# Patient Record
Sex: MALE | Race: WHITE
[De-identification: names, ages, dates, MRNs, and addresses within clinical notes are randomized per-mention and may not be internally consistent; named-entity substitution may affect disease eponyms.]

---

## 2017-05-20 ENCOUNTER — HOSPITAL ENCOUNTER (OUTPATIENT)
Dept: HOSPITAL 79 - LAB | Age: 47
End: 2017-05-20
Payer: COMMERCIAL

## 2017-05-20 DIAGNOSIS — Z02.83: Primary | ICD-10-CM

## 2021-04-16 ENCOUNTER — HOSPITAL ENCOUNTER (INPATIENT)
Dept: HOSPITAL 79 - ER1 | Age: 51
LOS: 6 days | Discharge: HOME | DRG: 897 | End: 2021-04-22
Attending: HOSPITALIST | Admitting: STUDENT IN AN ORGANIZED HEALTH CARE EDUCATION/TRAINING PROGRAM
Payer: MEDICARE

## 2021-04-16 VITALS — HEIGHT: 70 IN | WEIGHT: 159.56 LBS | BODY MASS INDEX: 22.84 KG/M2

## 2021-04-16 DIAGNOSIS — F17.210: ICD-10-CM

## 2021-04-16 DIAGNOSIS — E86.1: ICD-10-CM

## 2021-04-16 DIAGNOSIS — Z20.822: ICD-10-CM

## 2021-04-16 DIAGNOSIS — D64.9: ICD-10-CM

## 2021-04-16 DIAGNOSIS — F10.139: Primary | ICD-10-CM

## 2021-04-16 DIAGNOSIS — J44.9: ICD-10-CM

## 2021-04-16 DIAGNOSIS — Z82.0: ICD-10-CM

## 2021-04-16 DIAGNOSIS — E87.6: ICD-10-CM

## 2021-04-16 DIAGNOSIS — I10: ICD-10-CM

## 2021-04-16 DIAGNOSIS — E87.1: ICD-10-CM

## 2021-04-16 DIAGNOSIS — E83.42: ICD-10-CM

## 2021-04-16 DIAGNOSIS — B96.20: ICD-10-CM

## 2021-04-16 DIAGNOSIS — N30.00: ICD-10-CM

## 2021-04-16 DIAGNOSIS — Z83.3: ICD-10-CM

## 2021-04-16 DIAGNOSIS — R07.81: ICD-10-CM

## 2021-04-16 DIAGNOSIS — Z82.49: ICD-10-CM

## 2021-04-16 LAB
BUN/CREATININE RATIO: 13 (ref 0–10)
BUN/CREATININE RATIO: 16 (ref 0–10)
HGB BLD-MCNC: 12.9 GM/DL (ref 14–17.5)
RED BLOOD COUNT: 4.01 M/UL (ref 4.2–5.5)
WHITE BLOOD COUNT: 12.1 K/UL (ref 4.5–11)

## 2021-04-16 PROCEDURE — G0480 DRUG TEST DEF 1-7 CLASSES: HCPCS

## 2021-04-16 PROCEDURE — U0002 COVID-19 LAB TEST NON-CDC: HCPCS

## 2021-04-17 LAB
BUN/CREATININE RATIO: 13 (ref 0–10)
BUN/CREATININE RATIO: 16 (ref 0–10)
BUN/CREATININE RATIO: 18 (ref 0–10)
BUN/CREATININE RATIO: 19 (ref 0–10)
HGB BLD-MCNC: 10.7 GM/DL (ref 14–17.5)
RED BLOOD COUNT: 3.37 M/UL (ref 4.2–5.5)
WHITE BLOOD COUNT: 8.3 K/UL (ref 4.5–11)

## 2021-04-18 LAB
BUN/CREATININE RATIO: 15 (ref 0–10)
HGB BLD-MCNC: 9.7 GM/DL (ref 14–17.5)
RED BLOOD COUNT: 3.01 M/UL (ref 4.2–5.5)
WHITE BLOOD COUNT: 8.5 K/UL (ref 4.5–11)

## 2021-04-19 LAB
BUN/CREATININE RATIO: 16 (ref 0–10)
HGB BLD-MCNC: 9.9 GM/DL (ref 14–17.5)
RED BLOOD COUNT: 3.08 M/UL (ref 4.2–5.5)
WHITE BLOOD COUNT: 7.4 K/UL (ref 4.5–11)

## 2021-04-20 LAB
BUN/CREATININE RATIO: 13 (ref 0–10)
HGB BLD-MCNC: 9.5 GM/DL (ref 14–17.5)
RED BLOOD COUNT: 2.98 M/UL (ref 4.2–5.5)
WHITE BLOOD COUNT: 7 K/UL (ref 4.5–11)

## 2021-04-20 NOTE — NUR
WHILE PASSING MEDICATION PATIENT NOTED TO BEGIN HAVING A SEIZURE. PROVIDER AT
BEDSIDE. PATIENT NOTED TO BE HAVING JERKING MOVEMENTS. PATIEN GIVEN ATIVAN 2MG
IVP PER PROVIDER VERBAL ORDER. PATIENT ALSO PLACED ON 4LNC PER PROVIDER VERBAL
ORDER.

## 2021-04-21 LAB
BUN/CREATININE RATIO: 12 (ref 0–10)
HGB BLD-MCNC: 10 GM/DL (ref 14–17.5)
RED BLOOD COUNT: 3.08 M/UL (ref 4.2–5.5)
WHITE BLOOD COUNT: 5.8 K/UL (ref 4.5–11)

## 2021-04-22 LAB
BUN/CREATININE RATIO: 8 (ref 0–10)
HGB BLD-MCNC: 9.7 GM/DL (ref 14–17.5)
RED BLOOD COUNT: 3.02 M/UL (ref 4.2–5.5)
WHITE BLOOD COUNT: 6.1 K/UL (ref 4.5–11)

## 2021-10-07 ENCOUNTER — HOSPITAL ENCOUNTER (INPATIENT)
Dept: HOSPITAL 79 - ER1 | Age: 51
LOS: 13 days | Discharge: HOME | DRG: 463 | End: 2021-10-20
Attending: INTERNAL MEDICINE | Admitting: INTERNAL MEDICINE
Payer: MEDICARE

## 2021-10-07 VITALS — HEIGHT: 70 IN | BODY MASS INDEX: 24.34 KG/M2 | WEIGHT: 170 LBS

## 2021-10-07 DIAGNOSIS — E43: ICD-10-CM

## 2021-10-07 DIAGNOSIS — K74.60: ICD-10-CM

## 2021-10-07 DIAGNOSIS — Z83.3: ICD-10-CM

## 2021-10-07 DIAGNOSIS — Z91.14: ICD-10-CM

## 2021-10-07 DIAGNOSIS — L02.31: ICD-10-CM

## 2021-10-07 DIAGNOSIS — M54.16: ICD-10-CM

## 2021-10-07 DIAGNOSIS — Z82.49: ICD-10-CM

## 2021-10-07 DIAGNOSIS — Z96.611: ICD-10-CM

## 2021-10-07 DIAGNOSIS — M48.56XA: Primary | ICD-10-CM

## 2021-10-07 DIAGNOSIS — I10: ICD-10-CM

## 2021-10-07 DIAGNOSIS — R82.71: ICD-10-CM

## 2021-10-07 DIAGNOSIS — G40.909: ICD-10-CM

## 2021-10-07 DIAGNOSIS — E87.1: ICD-10-CM

## 2021-10-07 DIAGNOSIS — F17.210: ICD-10-CM

## 2021-10-07 DIAGNOSIS — E55.9: ICD-10-CM

## 2021-10-07 DIAGNOSIS — K76.0: ICD-10-CM

## 2021-10-07 DIAGNOSIS — M48.54XA: ICD-10-CM

## 2021-10-07 DIAGNOSIS — Z20.822: ICD-10-CM

## 2021-10-07 DIAGNOSIS — G62.1: ICD-10-CM

## 2021-10-07 DIAGNOSIS — K80.80: ICD-10-CM

## 2021-10-07 DIAGNOSIS — I82.401: ICD-10-CM

## 2021-10-07 DIAGNOSIS — S93.401A: ICD-10-CM

## 2021-10-07 DIAGNOSIS — J44.9: ICD-10-CM

## 2021-10-07 LAB
BUN/CREATININE RATIO: 7 (ref 0–10)
HGB BLD-MCNC: 13.5 GM/DL (ref 14–17.5)
RED BLOOD COUNT: 4.2 M/UL (ref 4.2–5.5)
WHITE BLOOD COUNT: 7.8 K/UL (ref 4.5–11)

## 2021-10-07 PROCEDURE — U0002 COVID-19 LAB TEST NON-CDC: HCPCS

## 2021-10-07 PROCEDURE — G0480 DRUG TEST DEF 1-7 CLASSES: HCPCS

## 2021-10-07 PROCEDURE — G0378 HOSPITAL OBSERVATION PER HR: HCPCS

## 2021-10-08 LAB
BUN/CREATININE RATIO: 8 (ref 0–10)
HGB BLD-MCNC: 12.7 GM/DL (ref 14–17.5)
RED BLOOD COUNT: 4.03 M/UL (ref 4.2–5.5)
WHITE BLOOD COUNT: 7 K/UL (ref 4.5–11)

## 2021-10-09 LAB
BUN/CREATININE RATIO: 9 (ref 0–10)
HGB BLD-MCNC: 12.6 GM/DL (ref 14–17.5)
RED BLOOD COUNT: 3.98 M/UL (ref 4.2–5.5)
WHITE BLOOD COUNT: 6.5 K/UL (ref 4.5–11)

## 2021-10-10 LAB
BUN/CREATININE RATIO: 9 (ref 0–10)
HEP C VIRUS AB: 0.2 (ref 0–0.9)

## 2021-10-10 NOTE — NUR
pt is on a CIWA protocol and has scored 4 for 2 assessments. at 0100 pt called
out with complaint of increased anxiety with nausea, and tremors. CIWA score
was 8. Dr. Funes notified of score and 1mg ativan was given at 0121.
Recheck was done 30 after prn medication with a score of 2.

## 2021-10-11 LAB
BUN/CREATININE RATIO: 8 (ref 0–10)
HGB BLD-MCNC: 11.5 GM/DL (ref 14–17.5)
RED BLOOD COUNT: 3.68 M/UL (ref 4.2–5.5)
T-TRANSGLUTAMINASE (TTG) IGG: (no result)
VITAMIN D, 25-HYDROXY: 9 NG/ML (ref 30–100)
WHITE BLOOD COUNT: 6.6 K/UL (ref 4.5–11)

## 2021-10-12 LAB
BUN/CREATININE RATIO: 10 (ref 0–10)
HGB BLD-MCNC: 11.8 GM/DL (ref 14–17.5)
RED BLOOD COUNT: 3.78 M/UL (ref 4.2–5.5)
T-TRANSGLUTAMINASE (TTG) IGA: <2 U/ML (ref 0–3)
WHITE BLOOD COUNT: 5.2 K/UL (ref 4.5–11)

## 2021-10-12 PROCEDURE — 0J990ZZ DRAINAGE OF BUTTOCK SUBCUTANEOUS TISSUE AND FASCIA, OPEN APPROACH: ICD-10-PCS | Performed by: SURGERY

## 2021-10-12 PROCEDURE — 0JB90ZZ EXCISION OF BUTTOCK SUBCUTANEOUS TISSUE AND FASCIA, OPEN APPROACH: ICD-10-PCS | Performed by: SURGERY

## 2021-10-13 LAB — BUN/CREATININE RATIO: 23 (ref 0–10)

## 2021-10-14 LAB — BUN/CREATININE RATIO: 18 (ref 0–10)

## 2021-10-16 LAB
BUN/CREATININE RATIO: 10 (ref 0–10)
HGB BLD-MCNC: 12.6 GM/DL (ref 14–17.5)
RED BLOOD COUNT: 3.92 M/UL (ref 4.2–5.5)
WHITE BLOOD COUNT: 7 K/UL (ref 4.5–11)

## 2021-10-20 LAB
BUN/CREATININE RATIO: 20 (ref 0–10)
HGB BLD-MCNC: 12.1 GM/DL (ref 14–17.5)
RED BLOOD COUNT: 3.93 M/UL (ref 4.2–5.5)
WHITE BLOOD COUNT: 7.7 K/UL (ref 4.5–11)

## 2021-12-17 ENCOUNTER — HOSPITAL ENCOUNTER (INPATIENT)
Dept: HOSPITAL 79 - ER1 | Age: 51
LOS: 20 days | Discharge: ANOTHER HOSPITAL | DRG: 871 | End: 2022-01-06
Attending: INTERNAL MEDICINE | Admitting: INTERNAL MEDICINE
Payer: MEDICARE

## 2021-12-17 VITALS — HEIGHT: 70.98 IN | WEIGHT: 125 LBS | BODY MASS INDEX: 17.5 KG/M2

## 2021-12-17 DIAGNOSIS — A41.89: Primary | ICD-10-CM

## 2021-12-17 DIAGNOSIS — J44.1: ICD-10-CM

## 2021-12-17 DIAGNOSIS — R73.9: ICD-10-CM

## 2021-12-17 DIAGNOSIS — I10: ICD-10-CM

## 2021-12-17 DIAGNOSIS — Z88.8: ICD-10-CM

## 2021-12-17 DIAGNOSIS — L89.322: ICD-10-CM

## 2021-12-17 DIAGNOSIS — B96.20: ICD-10-CM

## 2021-12-17 DIAGNOSIS — J96.21: ICD-10-CM

## 2021-12-17 DIAGNOSIS — L89.312: ICD-10-CM

## 2021-12-17 DIAGNOSIS — Z82.49: ICD-10-CM

## 2021-12-17 DIAGNOSIS — E87.6: ICD-10-CM

## 2021-12-17 DIAGNOSIS — E78.5: ICD-10-CM

## 2021-12-17 DIAGNOSIS — J45.909: ICD-10-CM

## 2021-12-17 DIAGNOSIS — F17.210: ICD-10-CM

## 2021-12-17 DIAGNOSIS — G40.909: ICD-10-CM

## 2021-12-17 DIAGNOSIS — Z79.01: ICD-10-CM

## 2021-12-17 DIAGNOSIS — Z91.14: ICD-10-CM

## 2021-12-17 DIAGNOSIS — Z83.3: ICD-10-CM

## 2021-12-17 DIAGNOSIS — Z20.822: ICD-10-CM

## 2021-12-17 DIAGNOSIS — I48.91: ICD-10-CM

## 2021-12-17 DIAGNOSIS — J18.9: ICD-10-CM

## 2021-12-17 DIAGNOSIS — G93.41: ICD-10-CM

## 2021-12-17 DIAGNOSIS — R65.21: ICD-10-CM

## 2021-12-17 DIAGNOSIS — N30.00: ICD-10-CM

## 2021-12-17 DIAGNOSIS — F10.139: ICD-10-CM

## 2021-12-17 DIAGNOSIS — J69.0: ICD-10-CM

## 2021-12-17 DIAGNOSIS — Z98.890: ICD-10-CM

## 2021-12-17 LAB
BUN/CREATININE RATIO: 16 (ref 0–10)
HGB BLD-MCNC: 16.6 GM/DL (ref 14–17.5)
RED BLOOD COUNT: 5.64 M/UL (ref 4.2–5.5)
WHITE BLOOD COUNT: 22.4 K/UL (ref 4.5–11)

## 2021-12-17 PROCEDURE — A6212 FOAM DRG <=16 SQ IN W/BORDER: HCPCS

## 2021-12-17 PROCEDURE — U0002 COVID-19 LAB TEST NON-CDC: HCPCS

## 2021-12-17 PROCEDURE — C9113 INJ PANTOPRAZOLE SODIUM, VIA: HCPCS

## 2021-12-17 PROCEDURE — C1751 CATH, INF, PER/CENT/MIDLINE: HCPCS

## 2021-12-17 PROCEDURE — G0480 DRUG TEST DEF 1-7 CLASSES: HCPCS

## 2021-12-17 PROCEDURE — 5A0945A ASSISTANCE WITH RESPIRATORY VENTILATION, 24-96 CONSECUTIVE HOURS, HIGH NASAL FLOW/VELOCITY: ICD-10-PCS | Performed by: INTERNAL MEDICINE

## 2021-12-18 LAB
BUN/CREATININE RATIO: 13 (ref 0–10)
BUN/CREATININE RATIO: 9 (ref 0–10)
HGB BLD-MCNC: 12.5 GM/DL (ref 14–17.5)
RED BLOOD COUNT: 4.34 M/UL (ref 4.2–5.5)
WHITE BLOOD COUNT: 15.9 K/UL (ref 4.5–11)

## 2021-12-19 LAB
BUN/CREATININE RATIO: 7 (ref 0–10)
BUN/CREATININE RATIO: 8 (ref 0–10)

## 2021-12-20 LAB
BUN/CREATININE RATIO: 6 (ref 0–10)
HGB BLD-MCNC: 13.7 GM/DL (ref 14–17.5)
RED BLOOD COUNT: 4.85 M/UL (ref 4.2–5.5)
WHITE BLOOD COUNT: 10.6 K/UL (ref 4.5–11)

## 2021-12-20 NOTE — NUR
DR KANG MADE AWARE PT HAS NO IV ACCESS AND HAS MISSED MULTIPLE ANTIBIOTICS
AS WELL AS HIS CT SCANS WERE NOT ABLE TO BE COMPLETED DUETO NO IV ACCESS.
AWAITING FURTHER ORDERS.

## 2021-12-21 LAB — BUN/CREATININE RATIO: 4 (ref 0–10)

## 2021-12-21 NOTE — NUR
dr starr called and aware of potassium being 2.6, new orders for protocol
intiaited. she has also been reminded of no iv access for the pt

## 2021-12-21 NOTE — NUR
PT HAS NO IV ACCESS AT THIS TIME, WAS TOLD IN REPORT MD IS AWARE AND AWAITING
ORDERS. PT STATES THAT HE MAY BE POSSIBLY GETTING A PICC LINE PLACED.

## 2021-12-23 LAB — BUN/CREATININE RATIO: 11 (ref 0–10)

## 2021-12-24 LAB
BUN/CREATININE RATIO: 15 (ref 0–10)
HGB BLD-MCNC: 13.7 GM/DL (ref 14–17.5)
RED BLOOD COUNT: 4.74 M/UL (ref 4.2–5.5)
WHITE BLOOD COUNT: 15.6 K/UL (ref 4.5–11)

## 2021-12-24 PROCEDURE — 5A09357 ASSISTANCE WITH RESPIRATORY VENTILATION, LESS THAN 24 CONSECUTIVE HOURS, CONTINUOUS POSITIVE AIRWAY PRESSURE: ICD-10-PCS | Performed by: INTERNAL MEDICINE

## 2021-12-24 NOTE — NUR
RN NOTED PATIENT TO BE UTILIZING ABDOMINAL MUSCLES TO BREATHE. PATIENT HEART
RATE 128 AND BLOOD PRESSURE 140/96. RN LISTENED TO PATIENT'S LUNGS UTILIZING
STETHOSCOPE DUE TO AUDIBLE WHEEZING. PATIENT BED LINEN CHANGED AND PATIENT SAT
UPRIGHT IN HIGH MAJANO'S POSITION. RN ENCOURAGED PATIENT TO COUGH AND DEEP
BREATHE. RN INCREASED OXYGEN TO 7 LITERS NASAL CANNULA. PATIENT OXYGEN NOTED
TO BE 90% UP FROM 88% ON 5 LITERS NASAL CANNULA. RN ATTEMPTED TO NOTIFY DR. HALL ABOUT PATIENT'S CHANGE IN CONDITION WITH NO ANSWER. RN LEFT VOICEMAIL TO
CALL HER BACK AS SOON AS POSSIBLE. RN CALLED DR. WILSON AND NOTIFIED HIM OF
PATIENT'S CHANGE IN CONDITION. NO NEW ORDERS NOTED. RN DID NOT ADMINISTER
MORNING MEDICATIONS DUE TO FEAR OF ASPIRATION. CONTINUOUS TELEMETRY AND PUSE
OXIMETRY IN PLACE. BED LOCKED AND LOW. CALL LIGHT WITHIN REACH.

## 2021-12-24 NOTE — NUR
RN ADMINISTERED PATIENT'S IV LASIX AND SAT PATIENT UP IN A HIGH MAJANO'S
POSITION TO ADMINISTER PO METOPROLOL. PATIENT SWALLOWED PILL BUT ONCE HE DRANK
THE WATER RN OFFERED HIM, HE BECAME SILENT AND COULD NOT COUGH. RN HELD ONTO
HIS LOWER BACK AND BENT HIM FORWARD AND URGED HIM TO COUGH. PATIENT FINALLY
BEGAN TO COUGH AND SPEAK AGAIN. RN CALLED DR. HALL AND NOTIFIED HER OF
PATIENT'S EPISODE. RN RECEIVED A CALL FROM TELEMETRY STATING THE PATIENT'S
OXYGEN WAS IN THE 70'S AND FALLING INTO THE 60'S. RN RANG THE STAFF ASSIST
BUTTON AND INCREASED PATIENT'S OXYGEN TO 7 LITERS. STAFF CALLED AN RRT AND DR. HALL ENTERED AND ORDERED PATIENT TO BE BAG MASKED. PATIENT OXYGEN INCREASED
TO 80'S. MD ORDERED RN TO HAVE PULMONOLOGY ASSESS PATIENT TO DETERMINE IF
PATIENT NEEDED TO BE ON PCU OR MED SURG. RN ATTEMPTED TO CALL DR. WILSON BUT
HE WAS IN THE ICU WITH ANOTHER PATIENT. RN ASKED ICU NURSE TO HAVE HIM COME TO
ROOM 4129 AS SOON AS POSSIBLE. MD CALLED RN AND ASKED WHAT WAS GOING ON WITH
THE PATIENT, RN UPDATED HIM, MD CAME TO ASSESS PATIENT. MD ORDERED RESPIRATORY
THERAPIST TO PLACE PATIENT ON A BIPAP AND EVENTUALLY PATIENT OXYGEN INCREASED
TO 94%. MD AND STAFF DECIDED PATIENT WOULD BE BETTER SUITED FOR THE ICU AT
THIS TIME. MD ORDERED RN TO PLACE A FIORE CATHETER FOR SKIN INTEGRITY
PROMOTION, CATHETER PLACED. VERBAL REPORT GIVEN TO ICU NURSE THAT CAME TO RRT,
PATIENT TAKEN TO ICU BY RN AND RESOURCE NURSE.

## 2021-12-25 LAB
BUN/CREATININE RATIO: 26 (ref 0–10)
HGB BLD-MCNC: 13.7 GM/DL (ref 14–17.5)
RED BLOOD COUNT: 4.76 M/UL (ref 4.2–5.5)
WHITE BLOOD COUNT: 15.5 K/UL (ref 4.5–11)

## 2021-12-25 PROCEDURE — 02HV33Z INSERTION OF INFUSION DEVICE INTO SUPERIOR VENA CAVA, PERCUTANEOUS APPROACH: ICD-10-PCS | Performed by: INTERNAL MEDICINE

## 2021-12-25 PROCEDURE — B548ZZA ULTRASONOGRAPHY OF SUPERIOR VENA CAVA, GUIDANCE: ICD-10-PCS | Performed by: INTERNAL MEDICINE

## 2021-12-25 PROCEDURE — 0BH17EZ INSERTION OF ENDOTRACHEAL AIRWAY INTO TRACHEA, VIA NATURAL OR ARTIFICIAL OPENING: ICD-10-PCS | Performed by: INTERNAL MEDICINE

## 2021-12-25 NOTE — NUR
DR. BAKER DLIW AT BEDSIDE. DECISION WAS MADE BY MD TO INTUBATE PATIENT. ATTEMPTED
TO NOTIFY  LISTED WITH NO SUCCESS. INTUBATION WAS SUCCESSFUL
WITHOUT COMPLICATION.

## 2021-12-26 LAB
BUN/CREATININE RATIO: 22 (ref 0–10)
HGB BLD-MCNC: 13.2 GM/DL (ref 14–17.5)
RED BLOOD COUNT: 4.59 M/UL (ref 4.2–5.5)
WHITE BLOOD COUNT: 28.8 K/UL (ref 4.5–11)

## 2021-12-26 PROCEDURE — 3E043XZ INTRODUCTION OF VASOPRESSOR INTO CENTRAL VEIN, PERCUTANEOUS APPROACH: ICD-10-PCS | Performed by: INTERNAL MEDICINE

## 2021-12-26 PROCEDURE — B24BZZZ ULTRASONOGRAPHY OF HEART WITH AORTA: ICD-10-PCS | Performed by: INTERNAL MEDICINE

## 2021-12-27 LAB
BUN/CREATININE RATIO: 22 (ref 0–10)
HGB BLD-MCNC: 13.2 GM/DL (ref 14–17.5)
RED BLOOD COUNT: 4.64 M/UL (ref 4.2–5.5)
WHITE BLOOD COUNT: 19 K/UL (ref 4.5–11)

## 2021-12-28 LAB
BUN/CREATININE RATIO: 22 (ref 0–10)
HGB BLD-MCNC: 13.4 GM/DL (ref 14–17.5)
RED BLOOD COUNT: 4.71 M/UL (ref 4.2–5.5)
WHITE BLOOD COUNT: 18.7 K/UL (ref 4.5–11)

## 2021-12-28 PROCEDURE — 5A1945Z RESPIRATORY VENTILATION, 24-96 CONSECUTIVE HOURS: ICD-10-PCS | Performed by: INTERNAL MEDICINE

## 2021-12-28 PROCEDURE — 0B968ZZ DRAINAGE OF RIGHT LOWER LOBE BRONCHUS, VIA NATURAL OR ARTIFICIAL OPENING ENDOSCOPIC: ICD-10-PCS | Performed by: INTERNAL MEDICINE

## 2021-12-28 PROCEDURE — 0B938ZZ DRAINAGE OF RIGHT MAIN BRONCHUS, VIA NATURAL OR ARTIFICIAL OPENING ENDOSCOPIC: ICD-10-PCS | Performed by: INTERNAL MEDICINE

## 2021-12-28 PROCEDURE — 0B9J8ZZ DRAINAGE OF LEFT LOWER LUNG LOBE, VIA NATURAL OR ARTIFICIAL OPENING ENDOSCOPIC: ICD-10-PCS | Performed by: INTERNAL MEDICINE

## 2021-12-28 PROCEDURE — 0B9G8ZZ DRAINAGE OF LEFT UPPER LUNG LOBE, VIA NATURAL OR ARTIFICIAL OPENING ENDOSCOPIC: ICD-10-PCS | Performed by: INTERNAL MEDICINE

## 2021-12-28 PROCEDURE — 0B958ZZ DRAINAGE OF RIGHT MIDDLE LOBE BRONCHUS, VIA NATURAL OR ARTIFICIAL OPENING ENDOSCOPIC: ICD-10-PCS | Performed by: INTERNAL MEDICINE

## 2021-12-28 NOTE — NUR
Bronchoscopy performed at bedside by , started at 07:40am on 12/28.
ICU nurse assisted, endoscopy nurse not available. 10ml Lidocaine used during
procedure and bronchial washings obtained. Bronchoscopy finished at 07:52am.
Vitals stable during procedure.

## 2021-12-29 LAB
BUN/CREATININE RATIO: 29 (ref 0–10)
HGB BLD-MCNC: 12.3 GM/DL (ref 14–17.5)
RED BLOOD COUNT: 4.45 M/UL (ref 4.2–5.5)
WHITE BLOOD COUNT: 9.6 K/UL (ref 4.5–11)

## 2021-12-30 LAB
BUN/CREATININE RATIO: 36 (ref 0–10)
HGB BLD-MCNC: 15.5 GM/DL (ref 14–17.5)
RED BLOOD COUNT: 5.46 M/UL (ref 4.2–5.5)
WHITE BLOOD COUNT: 23.8 K/UL (ref 4.5–11)

## 2021-12-30 PROCEDURE — 0BH17EZ INSERTION OF ENDOTRACHEAL AIRWAY INTO TRACHEA, VIA NATURAL OR ARTIFICIAL OPENING: ICD-10-PCS | Performed by: INTERNAL MEDICINE

## 2021-12-30 PROCEDURE — 5A1945Z RESPIRATORY VENTILATION, 24-96 CONSECUTIVE HOURS: ICD-10-PCS | Performed by: INTERNAL MEDICINE

## 2021-12-31 LAB
BUN/CREATININE RATIO: 21 (ref 0–10)
HGB BLD-MCNC: 13.2 GM/DL (ref 14–17.5)
RED BLOOD COUNT: 4.76 M/UL (ref 4.2–5.5)
WHITE BLOOD COUNT: 35 K/UL (ref 4.5–11)

## 2022-01-01 LAB
BUN/CREATININE RATIO: 15 (ref 0–10)
HGB BLD-MCNC: 12.3 GM/DL (ref 14–17.5)
RED BLOOD COUNT: 4.39 M/UL (ref 4.2–5.5)
WHITE BLOOD COUNT: 15.5 K/UL (ref 4.5–11)

## 2022-01-02 LAB
BUN/CREATININE RATIO: 20 (ref 0–10)
HGB BLD-MCNC: 13.2 GM/DL (ref 14–17.5)
RED BLOOD COUNT: 4.88 M/UL (ref 4.2–5.5)
WHITE BLOOD COUNT: 11 K/UL (ref 4.5–11)

## 2022-01-03 LAB
BUN/CREATININE RATIO: 10 (ref 0–10)
HGB BLD-MCNC: 13.9 GM/DL (ref 14–17.5)
RED BLOOD COUNT: 4.99 M/UL (ref 4.2–5.5)
WHITE BLOOD COUNT: 15 K/UL (ref 4.5–11)

## 2022-01-04 LAB
BUN/CREATININE RATIO: 11 (ref 0–10)
BUN/CREATININE RATIO: 8 (ref 0–10)
HGB BLD-MCNC: 11.1 GM/DL (ref 14–17.5)
RED BLOOD COUNT: 4.08 M/UL (ref 4.2–5.5)
WHITE BLOOD COUNT: 12.5 K/UL (ref 4.5–11)

## 2022-01-05 LAB
BUN/CREATININE RATIO: 8 (ref 0–10)
HGB BLD-MCNC: 12.1 GM/DL (ref 14–17.5)
RED BLOOD COUNT: 4.46 M/UL (ref 4.2–5.5)
WHITE BLOOD COUNT: 15.1 K/UL (ref 4.5–11)

## 2022-01-06 PROCEDURE — 4A00X4Z MEASUREMENT OF CENTRAL NERVOUS ELECTRICAL ACTIVITY, EXTERNAL APPROACH: ICD-10-PCS | Performed by: PSYCHIATRY & NEUROLOGY

## 2022-01-06 PROCEDURE — 4A10X4Z MONITORING OF CENTRAL NERVOUS ELECTRICAL ACTIVITY, EXTERNAL APPROACH: ICD-10-PCS | Performed by: PSYCHIATRY & NEUROLOGY
